# Patient Record
Sex: FEMALE | URBAN - METROPOLITAN AREA
[De-identification: names, ages, dates, MRNs, and addresses within clinical notes are randomized per-mention and may not be internally consistent; named-entity substitution may affect disease eponyms.]

---

## 2017-02-13 ENCOUNTER — IMPORTED ENCOUNTER (OUTPATIENT)
Dept: URBAN - METROPOLITAN AREA CLINIC 59 | Facility: CLINIC | Age: 72
End: 2017-02-13

## 2017-02-13 PROBLEM — H25.11 NUCLEAR SCLEROSIS CATARACT OF RT EYE: Noted: 2017-02-13

## 2017-02-13 PROBLEM — H25.12 NUCLEAR SCLEROSIS CATARACT OF LT EYE: Noted: 2017-02-13

## 2017-02-22 ENCOUNTER — IMPORTED ENCOUNTER (OUTPATIENT)
Dept: URBAN - METROPOLITAN AREA CLINIC 59 | Facility: CLINIC | Age: 72
End: 2017-02-22

## 2017-02-22 PROBLEM — H25.11 NUCLEAR SCLEROSIS CATARACT OF RT EYE: Noted: 2017-02-22

## 2017-02-22 PROBLEM — H25.12 NUCLEAR SCLEROSIS CATARACT OF LT EYE: Noted: 2017-02-22

## 2017-02-22 PROBLEM — I10 PRIMARY HYPERTENSION: Noted: 2017-02-22

## 2017-02-22 PROCEDURE — 92136 OPHTHALMIC BIOMETRY: CPT

## 2017-02-22 PROCEDURE — 92012 INTRM OPH EXAM EST PATIENT: CPT

## 2017-03-09 ENCOUNTER — IMPORTED ENCOUNTER (OUTPATIENT)
Dept: URBAN - METROPOLITAN AREA CLINIC 59 | Facility: CLINIC | Age: 72
End: 2017-03-09

## 2017-03-09 PROCEDURE — 66984 XCAPSL CTRC RMVL W/O ECP: CPT

## 2017-03-10 ENCOUNTER — IMPORTED ENCOUNTER (OUTPATIENT)
Dept: URBAN - METROPOLITAN AREA CLINIC 59 | Facility: CLINIC | Age: 72
End: 2017-03-10

## 2017-03-10 PROBLEM — Z96.1 PRESENCE OF PSEUDOPHAKIA: Noted: 2017-03-10

## 2017-03-10 PROCEDURE — 99024 POSTOP FOLLOW-UP VISIT: CPT

## 2017-03-17 ENCOUNTER — IMPORTED ENCOUNTER (OUTPATIENT)
Dept: URBAN - METROPOLITAN AREA CLINIC 59 | Facility: CLINIC | Age: 72
End: 2017-03-17

## 2017-03-17 PROBLEM — Z96.1 PRESENCE OF PSEUDOPHAKIA: Noted: 2017-03-17

## 2017-03-17 PROCEDURE — 99024 POSTOP FOLLOW-UP VISIT: CPT

## 2017-03-23 ENCOUNTER — IMPORTED ENCOUNTER (OUTPATIENT)
Dept: URBAN - METROPOLITAN AREA CLINIC 59 | Facility: CLINIC | Age: 72
End: 2017-03-23

## 2017-03-23 PROCEDURE — 66984 XCAPSL CTRC RMVL W/O ECP: CPT

## 2017-03-24 ENCOUNTER — IMPORTED ENCOUNTER (OUTPATIENT)
Dept: URBAN - METROPOLITAN AREA CLINIC 59 | Facility: CLINIC | Age: 72
End: 2017-03-24

## 2017-03-24 PROBLEM — Z96.1 PRESENCE OF PSEUDOPHAKIA: Noted: 2017-03-24

## 2017-03-24 PROCEDURE — 99024 POSTOP FOLLOW-UP VISIT: CPT

## 2017-04-10 ENCOUNTER — IMPORTED ENCOUNTER (OUTPATIENT)
Dept: URBAN - METROPOLITAN AREA CLINIC 59 | Facility: CLINIC | Age: 72
End: 2017-04-10

## 2017-04-10 PROBLEM — Z96.1 PRESENCE OF PSEUDOPHAKIA: Noted: 2017-04-10

## 2017-04-10 PROCEDURE — 99024 POSTOP FOLLOW-UP VISIT: CPT

## 2017-04-17 ENCOUNTER — IMPORTED ENCOUNTER (OUTPATIENT)
Dept: URBAN - METROPOLITAN AREA CLINIC 59 | Facility: CLINIC | Age: 72
End: 2017-04-17

## 2017-04-17 PROBLEM — Z96.1 PRESENCE OF PSEUDOPHAKIA: Noted: 2017-04-17

## 2017-04-17 PROCEDURE — 99024 POSTOP FOLLOW-UP VISIT: CPT

## 2017-10-11 ENCOUNTER — IMPORTED ENCOUNTER (OUTPATIENT)
Dept: URBAN - METROPOLITAN AREA CLINIC 59 | Facility: CLINIC | Age: 72
End: 2017-10-11

## 2017-10-11 PROBLEM — I10 PRIMARY HYPERTENSION: Noted: 2017-10-11

## 2017-10-11 PROBLEM — H26.493 OTHER SECONDARY CATARACT, BILATERAL: Noted: 2017-10-11

## 2017-10-11 PROBLEM — H04.123 TEAR FILM INSUFFICIENCY OF BILATERAL LACRIMAL GLANDS: Noted: 2017-10-11

## 2017-10-11 PROCEDURE — 92014 COMPRE OPH EXAM EST PT 1/>: CPT

## 2017-10-11 PROCEDURE — 92015 DETERMINE REFRACTIVE STATE: CPT

## 2018-04-09 ENCOUNTER — IMPORTED ENCOUNTER (OUTPATIENT)
Dept: URBAN - METROPOLITAN AREA CLINIC 59 | Facility: CLINIC | Age: 73
End: 2018-04-09

## 2018-04-09 PROBLEM — H04.123 TEAR FILM INSUFFICIENCY OF BILATERAL LACRIMAL GLANDS: Noted: 2018-04-09

## 2018-04-09 PROBLEM — I10 PRIMARY HYPERTENSION: Noted: 2018-04-09

## 2018-04-09 PROBLEM — H26.493 OTHER SECONDARY CATARACT, BILATERAL: Noted: 2018-04-09

## 2018-04-09 PROCEDURE — 92012 INTRM OPH EXAM EST PATIENT: CPT

## 2018-04-18 ENCOUNTER — IMPORTED ENCOUNTER (OUTPATIENT)
Dept: URBAN - METROPOLITAN AREA CLINIC 59 | Facility: CLINIC | Age: 73
End: 2018-04-18

## 2018-04-18 PROBLEM — H52.11 MYOPIA, RIGHT EYE: Noted: 2018-04-18

## 2018-04-18 PROCEDURE — 92312 CONTACT LENS FITG APHAKIA OU: CPT

## 2018-10-08 ENCOUNTER — IMPORTED ENCOUNTER (OUTPATIENT)
Dept: URBAN - METROPOLITAN AREA CLINIC 59 | Facility: CLINIC | Age: 73
End: 2018-10-08

## 2018-10-08 PROBLEM — H26.493 OTHER SECONDARY CATARACT, BILATERAL: Noted: 2018-10-08

## 2018-10-08 PROBLEM — I10 PRIMARY HYPERTENSION: Noted: 2018-10-08

## 2018-10-08 PROBLEM — H02.052 TRICHIASIS WITHOUT ENTROPION RIGHT LOWER EYELID: Noted: 2018-10-08

## 2018-10-08 PROBLEM — H04.123 TEAR FILM INSUFFICIENCY OF BILATERAL LACRIMAL GLANDS: Noted: 2018-10-08

## 2018-10-08 PROCEDURE — 92014 COMPRE OPH EXAM EST PT 1/>: CPT

## 2018-10-08 PROCEDURE — 67820 REVISE EYELASHES: CPT

## 2019-04-05 ENCOUNTER — APPOINTMENT (RX ONLY)
Dept: URBAN - METROPOLITAN AREA CLINIC 348 | Facility: CLINIC | Age: 74
Setting detail: DERMATOLOGY
End: 2019-04-05

## 2019-04-05 DIAGNOSIS — L57.0 ACTINIC KERATOSIS: ICD-10-CM

## 2019-04-05 DIAGNOSIS — L82.1 OTHER SEBORRHEIC KERATOSIS: ICD-10-CM

## 2019-04-05 PROCEDURE — ? LIQUID NITROGEN

## 2019-04-05 PROCEDURE — 99202 OFFICE O/P NEW SF 15 MIN: CPT | Mod: 25

## 2019-04-05 PROCEDURE — 17003 DESTRUCT PREMALG LES 2-14: CPT

## 2019-04-05 PROCEDURE — 17000 DESTRUCT PREMALG LESION: CPT

## 2019-04-05 PROCEDURE — ? COUNSELING

## 2019-04-05 ASSESSMENT — LOCATION ZONE DERM
LOCATION ZONE: NOSE
LOCATION ZONE: FACE

## 2019-04-05 ASSESSMENT — LOCATION DETAILED DESCRIPTION DERM
LOCATION DETAILED: RIGHT CENTRAL MALAR CHEEK
LOCATION DETAILED: LEFT FOREHEAD
LOCATION DETAILED: LEFT NASAL SIDEWALL
LOCATION DETAILED: RIGHT INFERIOR CENTRAL MALAR CHEEK
LOCATION DETAILED: NASAL ROOT
LOCATION DETAILED: RIGHT INFERIOR MEDIAL MALAR CHEEK
LOCATION DETAILED: LEFT LATERAL BUCCAL CHEEK

## 2019-04-05 ASSESSMENT — LOCATION SIMPLE DESCRIPTION DERM
LOCATION SIMPLE: LEFT CHEEK
LOCATION SIMPLE: RIGHT CHEEK
LOCATION SIMPLE: LEFT FOREHEAD
LOCATION SIMPLE: NOSE
LOCATION SIMPLE: LEFT NOSE

## 2019-04-05 NOTE — PROCEDURE: MIPS QUALITY
Quality 47: Advance Care Plan: Advance Care Planning discussed and documented in the medical record; patient did not wish or was not able to name a surrogate decision maker or provide an advance care plan.
Quality 110: Preventive Care And Screening: Influenza Immunization: Influenza Immunization Administered during Influenza season
Detail Level: Detailed
Quality 130: Documentation Of Current Medications In The Medical Record: Current Medications Documented
Quality 111:Pneumonia Vaccination Status For Older Adults: Pneumococcal Vaccination Previously Received
Quality 226: Preventive Care And Screening: Tobacco Use: Screening And Cessation Intervention: Patient screened for tobacco use and is an ex/non-smoker
Quality 131: Pain Assessment And Follow-Up: Pain assessment using a standardized tool is documented as negative, no follow-up plan required

## 2019-04-10 ENCOUNTER — IMPORTED ENCOUNTER (OUTPATIENT)
Dept: URBAN - METROPOLITAN AREA CLINIC 59 | Facility: CLINIC | Age: 74
End: 2019-04-10

## 2019-04-10 PROBLEM — H52.11 MYOPIA, RIGHT EYE: Noted: 2019-04-10

## 2019-04-10 PROCEDURE — 92312 CONTACT LENS FITG APHAKIA OU: CPT

## 2019-08-28 ENCOUNTER — IMPORTED ENCOUNTER (OUTPATIENT)
Dept: URBAN - METROPOLITAN AREA CLINIC 59 | Facility: CLINIC | Age: 74
End: 2019-08-28

## 2019-08-28 PROBLEM — H52.11 MYOPIA, RIGHT EYE: Noted: 2019-08-28

## 2019-10-02 ENCOUNTER — IMPORTED ENCOUNTER (OUTPATIENT)
Dept: URBAN - METROPOLITAN AREA CLINIC 59 | Facility: CLINIC | Age: 74
End: 2019-10-02

## 2019-10-02 PROBLEM — I10 PRIMARY HYPERTENSION: Noted: 2019-10-02

## 2019-10-02 PROBLEM — H04.123 TEAR FILM INSUFFICIENCY OF BILATERAL LACRIMAL GLANDS: Noted: 2019-10-02

## 2019-10-02 PROBLEM — H26.493 OTHER SECONDARY CATARACT, BILATERAL: Noted: 2019-10-02

## 2019-10-02 PROCEDURE — 92014 COMPRE OPH EXAM EST PT 1/>: CPT

## 2023-10-14 ASSESSMENT — PACHYMETRY
OS_CT_UM: C:  FINAL: 0.000; P:
OS_CT_UM: C:  FINAL: 0.000; P:
OD_CT_UM: C:  FINAL: 0.000; P:
OS_CT_UM: C:  FINAL: 0.000; P:
OD_CT_UM: C:  FINAL: 0.000; P:
OS_CT_UM: C:  AVG: 0.000 FINAL: 0.000; P:
OS_CT_UM: C:  AVG: 0.000 FINAL: 0.000; P:
OS_CT_UM: C:  FINAL: 0.000; P:
OD_CT_UM: C:  AVG: 0.000 FINAL: 0.000; P:
OD_CT_UM: C:  AVG: 0.000 FINAL: 0.000; P:

## 2023-10-14 ASSESSMENT — VISUAL ACUITY
OS_CC: 20/100+1
OD_SC: 20/25
OD_CC: 20/50
OD_CC: 20/20
OS_SC: J1+
OS_CC: 20/200
OD_CC: 20/20
OD_SC: 20/20-2
OD_SC: 20/40
OS_SC: J1
OD_SC: 20/40+1
OS_SC: 20/70
OD_CC: 20/20-1
OS_SC: 20/150
OS_SC: J1
OS_SC: 20/70-1
OS_SC: 20/100
OD_CC: 20/50-2
OD_CC: 20/50
OS_SC: 20/80
OD_SC: 20/25+2
OS_CC: 20/200
OS_CC: 20/100
OS_SC: 20/70+2
OS_SC: 20/100
OD_CC: 20/20-2

## 2023-10-14 ASSESSMENT — KERATOMETRY
OS_AXISANGLE_DEGREES: 177
OD_AXISANGLE2_DEGREES: 105
OD_K2POWER_DIOPTERS: 47.07
OS_AXISANGLE2_DEGREES: 87
OS_K1POWER_DIOPTERS: 45.61
OS_K2POWER_DIOPTERS: 47.47
OD_K1POWER_DIOPTERS: 45.18
OD_AXISANGLE_DEGREES: 15

## 2023-10-14 ASSESSMENT — TONOMETRY
OS_IOP_MMHG: 20
OD_IOP_MMHG: 13
OS_IOP_MMHG: 15
OS_IOP_MMHG: 12
OD_IOP_MMHG: 10
OS_IOP_MMHG: 17
OD_IOP_MMHG: 12
OD_IOP_MMHG: 16
OS_IOP_MMHG: 10
OS_IOP_MMHG: 14